# Patient Record
Sex: FEMALE | Race: WHITE | URBAN - METROPOLITAN AREA
[De-identification: names, ages, dates, MRNs, and addresses within clinical notes are randomized per-mention and may not be internally consistent; named-entity substitution may affect disease eponyms.]

---

## 2018-02-08 ENCOUNTER — HISTORICAL (OUTPATIENT)
Dept: LAB | Facility: HOSPITAL | Age: 68
End: 2018-02-08

## 2018-02-08 LAB — HCV AB SERPL QL IA: NEGATIVE

## 2018-06-14 ENCOUNTER — HISTORICAL (OUTPATIENT)
Dept: ADMINISTRATIVE | Facility: HOSPITAL | Age: 68
End: 2018-06-14

## 2019-06-11 ENCOUNTER — HISTORICAL (OUTPATIENT)
Dept: LAB | Facility: HOSPITAL | Age: 69
End: 2019-06-11

## 2019-06-11 ENCOUNTER — HISTORICAL (OUTPATIENT)
Dept: ADMINISTRATIVE | Facility: HOSPITAL | Age: 69
End: 2019-06-11

## 2019-06-11 LAB
ABS NEUT (OLG): 3.9 X10(3)/MCL (ref 2.1–9.2)
ALBUMIN SERPL-MCNC: 4.3 GM/DL (ref 3.4–5)
ALBUMIN/GLOB SERPL: 1.72 {RATIO} (ref 1.5–2.5)
ALP SERPL-CCNC: 57 UNIT/L (ref 38–126)
ALT SERPL-CCNC: 22 UNIT/L (ref 7–52)
APPEARANCE, UA: ABNORMAL
AST SERPL-CCNC: 19 UNIT/L (ref 15–37)
BACTERIA #/AREA URNS AUTO: ABNORMAL /HPF
BILIRUB SERPL-MCNC: 0.4 MG/DL (ref 0.2–1)
BILIRUB UR QL STRIP: NEGATIVE MG/DL
BILIRUBIN DIRECT+TOT PNL SERPL-MCNC: 0.1 MG/DL (ref 0–0.5)
BILIRUBIN DIRECT+TOT PNL SERPL-MCNC: 0.3 MG/DL
BUN SERPL-MCNC: 23 MG/DL (ref 7–18)
CALCIUM SERPL-MCNC: 9.2 MG/DL (ref 8.5–10)
CHLORIDE SERPL-SCNC: 106 MMOL/L (ref 98–107)
CHOLEST SERPL-MCNC: 204 MG/DL (ref 0–200)
CHOLEST/HDLC SERPL: 3.6 {RATIO}
CO2 SERPL-SCNC: 27 MMOL/L (ref 21–32)
COLOR UR: YELLOW
CREAT SERPL-MCNC: 0.67 MG/DL (ref 0.6–1.3)
DEPRECATED CALCIDIOL+CALCIFEROL SERPL-MC: 60.6 NG/ML (ref 30–80)
ERYTHROCYTE [DISTWIDTH] IN BLOOD BY AUTOMATED COUNT: 13.2 % (ref 11.5–17)
GLOBULIN SER-MCNC: 2.5 GM/DL (ref 1.2–3)
GLUCOSE (UA): NEGATIVE MG/DL
GLUCOSE SERPL-MCNC: 104 MG/DL (ref 74–106)
HCT VFR BLD AUTO: 41.1 % (ref 37–47)
HCV AB SERPL QL IA: NEGATIVE
HDLC SERPL-MCNC: 56 MG/DL (ref 35–60)
HGB BLD-MCNC: 13.4 GM/DL (ref 12–16)
HGB UR QL STRIP: ABNORMAL UNIT/L
KETONES UR QL STRIP: NEGATIVE MG/DL
LDLC SERPL CALC-MCNC: 130 MG/DL (ref 0–129)
LEUKOCYTE ESTERASE UR QL STRIP: NEGATIVE UNIT/L
LYMPHOCYTES # BLD AUTO: 1.5 X10(3)/MCL (ref 0.6–3.4)
LYMPHOCYTES NFR BLD AUTO: 24.2 % (ref 13–40)
MCH RBC QN AUTO: 29.8 PG (ref 27–31.2)
MCHC RBC AUTO-ENTMCNC: 33 GM/DL (ref 32–36)
MCV RBC AUTO: 92 FL (ref 80–94)
MONOCYTES # BLD AUTO: 0.7 X10(3)/MCL (ref 0.1–1.3)
MONOCYTES NFR BLD AUTO: 11.1 % (ref 0.1–24)
NEUTROPHILS NFR BLD AUTO: 64.7 % (ref 47–80)
NITRITE UR QL STRIP.AUTO: NEGATIVE
PH UR STRIP: 7 [PH]
PLATELET # BLD AUTO: 345 X10(3)/MCL (ref 130–400)
PMV BLD AUTO: 9.2 FL (ref 9.4–12.4)
POTASSIUM SERPL-SCNC: 4.8 MMOL/L (ref 3.5–5.1)
PROT SERPL-MCNC: 6.8 GM/DL (ref 6.4–8.2)
PROT UR QL STRIP: NEGATIVE MG/DL
RBC # BLD AUTO: 4.49 X10(6)/MCL (ref 4.2–5.4)
RBC #/AREA URNS HPF: ABNORMAL /HPF
SODIUM SERPL-SCNC: 140 MMOL/L (ref 136–145)
SP GR UR STRIP: 1.02
SQUAMOUS EPITHELIAL, UA: ABNORMAL /LPF
TRIGL SERPL-MCNC: 106 MG/DL (ref 30–150)
TSH SERPL-ACNC: 3 MIU/ML (ref 0.35–4.94)
UROBILINOGEN UR STRIP-ACNC: 0.2 MG/DL
VLDLC SERPL CALC-MCNC: 21.2 MG/DL
WBC # SPEC AUTO: 6.1 X10(3)/MCL (ref 4.5–11.5)
WBC #/AREA URNS AUTO: ABNORMAL /[HPF]

## 2019-06-13 ENCOUNTER — HISTORICAL (OUTPATIENT)
Dept: ADMINISTRATIVE | Facility: HOSPITAL | Age: 69
End: 2019-06-13

## 2019-06-13 LAB
APPEARANCE, UA: ABNORMAL
BACTERIA #/AREA URNS AUTO: ABNORMAL /HPF
BILIRUB UR QL STRIP: NEGATIVE MG/DL
COLOR UR: YELLOW
GLUCOSE (UA): NEGATIVE MG/DL
HGB UR QL STRIP: ABNORMAL UNIT/L
KETONES UR QL STRIP: NEGATIVE MG/DL
LEUKOCYTE ESTERASE UR QL STRIP: NEGATIVE UNIT/L
NITRITE UR QL STRIP.AUTO: NEGATIVE
PH UR STRIP: 6.5 [PH]
PROT UR QL STRIP: NEGATIVE MG/DL
RBC #/AREA URNS HPF: ABNORMAL /HPF
SP GR UR STRIP: 1.01
SQUAMOUS EPITHELIAL, UA: ABNORMAL /LPF
UROBILINOGEN UR STRIP-ACNC: 0.2 MG/DL
WBC #/AREA URNS AUTO: ABNORMAL /[HPF]

## 2020-03-03 ENCOUNTER — HISTORICAL (OUTPATIENT)
Dept: LAB | Facility: HOSPITAL | Age: 70
End: 2020-03-03

## 2020-03-03 ENCOUNTER — HISTORICAL (OUTPATIENT)
Dept: ADMINISTRATIVE | Facility: HOSPITAL | Age: 70
End: 2020-03-03

## 2020-03-03 LAB
ABS NEUT (OLG): 13.8 X10(3)/MCL (ref 2.1–9.2)
ERYTHROCYTE [DISTWIDTH] IN BLOOD BY AUTOMATED COUNT: 13 % (ref 11.5–17)
FLUAV AG NPH QL IA: NEGATIVE
FLUBV AG NPH QL IA: NEGATIVE
HCT VFR BLD AUTO: 41.3 % (ref 37–47)
HGB BLD-MCNC: 13.1 GM/DL (ref 12–16)
LYMPHOCYTES # BLD AUTO: 1 X10(3)/MCL (ref 0.6–3.4)
LYMPHOCYTES NFR BLD AUTO: 6.5 % (ref 13–40)
MCH RBC QN AUTO: 28.4 PG (ref 27–31.2)
MCHC RBC AUTO-ENTMCNC: 32 GM/DL (ref 32–36)
MCV RBC AUTO: 90 FL (ref 80–94)
MONOCYTES # BLD AUTO: 0.6 X10(3)/MCL (ref 0.1–1.3)
MONOCYTES NFR BLD AUTO: 4.2 % (ref 0.1–24)
NEUTROPHILS NFR BLD AUTO: 89.3 % (ref 47–80)
PLATELET # BLD AUTO: 465 X10(3)/MCL (ref 130–400)
PMV BLD AUTO: 9.1 FL (ref 9.4–12.4)
RBC # BLD AUTO: 4.61 X10(6)/MCL (ref 4.2–5.4)
WBC # SPEC AUTO: 15.4 X10(3)/MCL (ref 4.5–11.5)

## 2020-03-10 ENCOUNTER — HISTORICAL (OUTPATIENT)
Dept: ADMINISTRATIVE | Facility: HOSPITAL | Age: 70
End: 2020-03-10

## 2020-09-28 ENCOUNTER — HISTORICAL (OUTPATIENT)
Dept: ADMINISTRATIVE | Facility: HOSPITAL | Age: 70
End: 2020-09-28

## 2020-10-01 LAB — FINAL CULTURE: NORMAL

## 2021-03-17 ENCOUNTER — HISTORICAL (OUTPATIENT)
Dept: ADMINISTRATIVE | Facility: HOSPITAL | Age: 71
End: 2021-03-17

## 2021-03-17 LAB
ABS NEUT (OLG): 4.2 X10(3)/MCL (ref 2.1–9.2)
ALBUMIN SERPL-MCNC: 4.2 GM/DL (ref 3.4–5)
ALBUMIN/GLOB SERPL: 1.75 {RATIO} (ref 1.5–2.5)
ALP SERPL-CCNC: 69 UNIT/L (ref 38–126)
ALT SERPL-CCNC: 20 UNIT/L (ref 7–52)
AST SERPL-CCNC: 22 UNIT/L (ref 15–37)
BILIRUB SERPL-MCNC: 0.4 MG/DL (ref 0.2–1)
BILIRUBIN DIRECT+TOT PNL SERPL-MCNC: 0.1 MG/DL (ref 0–0.5)
BILIRUBIN DIRECT+TOT PNL SERPL-MCNC: 0.3 MG/DL
BUN SERPL-MCNC: 21 MG/DL (ref 7–18)
CALCIUM SERPL-MCNC: 9.4 MG/DL (ref 8.5–10)
CHLORIDE SERPL-SCNC: 105 MMOL/L (ref 98–107)
CO2 SERPL-SCNC: 26 MMOL/L (ref 21–32)
CREAT SERPL-MCNC: 0.73 MG/DL (ref 0.6–1.3)
ERYTHROCYTE [DISTWIDTH] IN BLOOD BY AUTOMATED COUNT: 13.5 % (ref 11.5–17)
GLOBULIN SER-MCNC: 2.4 GM/DL (ref 1.2–3)
GLUCOSE SERPL-MCNC: 98 MG/DL (ref 74–106)
HCT VFR BLD AUTO: 38.4 % (ref 37–47)
HGB BLD-MCNC: 12.3 GM/DL (ref 12–16)
LYMPHOCYTES # BLD AUTO: 2.2 X10(3)/MCL (ref 0.6–3.4)
LYMPHOCYTES NFR BLD AUTO: 31.9 % (ref 13–40)
MCH RBC QN AUTO: 28.9 PG (ref 27–31.2)
MCHC RBC AUTO-ENTMCNC: 32 GM/DL (ref 32–36)
MCV RBC AUTO: 90 FL (ref 80–94)
MONOCYTES # BLD AUTO: 0.6 X10(3)/MCL (ref 0.1–1.3)
MONOCYTES NFR BLD AUTO: 8.9 % (ref 0.1–24)
NEUTROPHILS NFR BLD AUTO: 59.2 % (ref 47–80)
PLATELET # BLD AUTO: 347 X10(3)/MCL (ref 130–400)
PMV BLD AUTO: 9.1 FL (ref 9.4–12.4)
POTASSIUM SERPL-SCNC: 4.5 MMOL/L (ref 3.5–5.1)
PROT SERPL-MCNC: 6.6 GM/DL (ref 6.4–8.2)
RBC # BLD AUTO: 4.26 X10(6)/MCL (ref 4.2–5.4)
SODIUM SERPL-SCNC: 140 MMOL/L (ref 136–145)
WBC # SPEC AUTO: 7 X10(3)/MCL (ref 4.5–11.5)

## 2022-04-11 ENCOUNTER — HISTORICAL (OUTPATIENT)
Dept: ADMINISTRATIVE | Facility: HOSPITAL | Age: 72
End: 2022-04-11

## 2022-04-25 VITALS
WEIGHT: 222.25 LBS | SYSTOLIC BLOOD PRESSURE: 136 MMHG | OXYGEN SATURATION: 97 % | HEIGHT: 60 IN | BODY MASS INDEX: 43.63 KG/M2 | DIASTOLIC BLOOD PRESSURE: 78 MMHG

## 2022-05-05 NOTE — HISTORICAL [facility] CERNER
This is a historical note converted from Jayesh. Formatting and pictures may have been removed.  Please reference Jayesh for original formatting and attached multimedia. Chief Complaint  cough/night sweats  History of Present Illness  The patient is a 69 year old white female who presents today with symptoms of cough, cold, and congestion.? Duration of symptoms?is 1 week. ?Associated symptoms include sore throat,?nasal congestion,?rhinorrhea,?sinus pressure,?and productive cough.? Prior treatment?over-the-counter has been?with?Mucinex.? The patient reports?subjective?fever and chills.? The patient reports negative?sick contacts.? Symptoms began last week on approximately Thursday?with body aches, fatigue, and cough.? Over time, the patient?reports?intermittent fever and chills subjectively, night sweats, persistent cough,?intermittent?left?chest?pleuritic pain with coughing. ?No shortness of breath?and no wheeze.? No similar symptoms in the past.? He initiated a Medrol Dosepak and a Z-Saud?yesterday with some improvement in symptoms.  Review of Systems  Constitutional_no fever?or chills/positive fatigue/no unintentional weight gain  Eye_  ENMT_nasal congestion/sinus?pressure?and tenderness/postnasal drip/ear pressure bilaterally  Respiratory_positive?productive cough/no shortness of breath  Cardiovascular_no chest pain/no palpitations  Gastrointestinal_  Genitourinary_  Hema/Lymph_positive cervical anterior?adenopathy  Endocrine_  Immunologic_  Musculoskeletal_left chest?wall pain with coughing  Integumentary_no rash  Neurologic_no headache  All Other ROS_negative  Physical Exam  Vitals & Measurements  T:?36.8? ?C (Oral)? SpO2:?94%?  ?  VITAL SIGNS:? Reviewed.?Within normal limits.  GENERAL:?In no apparent distress.? Alert and Oriented x3  HEAD:?No signs of head trauma.Normocephalic  EYES:?Pupils equal/round/reactive to light.? Extraocular motionsintact.?no scleral injection  EARS:? Hearing grosslyintact. TMs and  EACclearno drainage. Helix/tragus?not tenderto traction  NARES:?No erythema.? Turbinatesnot boggy.?No discharge.?No lesions.  SINUSES:?Nontender  MOUTH:? Oropharynx isclear.No erythema.No exudatesNo cobblestoningNo ulcerations.tonsils2+  NECK:?No Lymphadenopathy.No Jugulovenous distension.?No thyromegaly.No bruits  CHEST:? Chest withclear breath sounds bilaterally.?+wheezes,+rales,+rhonchi, positive crackle?all with?expiration and inspiration?left?lower lung base.? Mild increased?vocal fremitus in the same region..?+Diminished air movement-primarily?caused by pain with deep inspiration.  CARDIAC:?Regular rate and rhythm.?S1 and S2,without murmurs,gallops, orrubs.  SKIN:?No rashorlesions.  Assessment/Plan  1.?Left lower lobe pneumonia?J18.9  ?-The patient is clinically stable within the office today but ER precautions were given for new or worsening symptoms  -Chest x-ray was performed?and?noted bilateral active infiltrates?versus atelectatic?changes?in?bilateral bases left greater than right.-Correlates with?active infiltrate based on patients recent symptoms and HPI and physical exam  -Informational packet given on pneumonia?and ER precautions given.  -Rapid flu negative  -CBC/mycoplasma drawn  -Rocephin 1 g IM x1  -Levaquin?750 mg 1 tab by mouth daily x10 days/dispense #10 with no refills  -Phenergan DM to be used as prescribed below PRN  -Incentive spirometer issued?and education was given?on use 10 actuations per hour  -Patient should use her?already required albuterol rescue inhaler as needed for tightness of chest/wheezing  -OTC Mucinex D?and coolmist humidifier advocated  -Follow-up in 7 days for repeat chest x-ray and repeat clinical evaluation or sooner if clinically indicated.  Ordered:  levoFLOXacin, 750 mg = 1 tab(s), Oral, q24hr, take probiotic daily, X 10 day(s), # 10 tab(s), 0 Refill(s), Pharmacy: [facility]/pharmacy #[id number], 152, cm, Height/Length Dosing, 06/13/19 13:34:00 CDT, 100.8, kg, Weight Dosing,  06/13/19 13:34:00 CDT  Misc Prescription, INCENTIVE SPIROMETER, See Instructions, 10 ACTUATIONS PER HOUR AS DIRECTED DX: PNEUMONIA, # 1 EA, 0 Refill(s)  Clinic Follow up, *Est. 03/10/20 14:15:00 CDT, Order for future visit, Left lower lobe pneumonia, [facility]  No Charge Per Visit PC, Left lower lobe pneumonia  Eustachian tube dysfunction, [facility], 03/03/20 14:40:00 CST  ?  2.?Eustachian tube dysfunction?H69.80  ?-ET dysfunction likely secondary to early?viral?infection?which was also likely the etiology of this pneumonia  -Continue?Zyrtec/fluticasone/Neomed sinus rinse  Ordered:  No Charge Per Visit PC, Left lower lobe pneumonia  Eustachian tube dysfunction, [facility], 03/03/20 14:40:00 CST  ?  Orders:  Misc Prescription, Phenergan DM, 1 tsp, Oral, q6hr, PRN cough, # 3 oz, 0 Refill(s), Pharmacy: [facility]/pharmacy #[id number], 152, cm, Height/Length Dosing, 06/13/19 13:34:00 CDT, 100.8, kg, Weight Dosing, 06/13/19 13:34:00 CDT  Referrals  Clinic Follow up, *Est. 03/10/20 14:15:00 CDT, Order for future visit, Left lower lobe pneumonia, [facility]   Problem List/Past Medical History  Ongoing  HLD (hyperlipidemia)  Morbid obesity  Osteopenia  Historical  No qualifying data  Procedure/Surgical History  Colonoscopy (2012)  H/O: hysterectomy  Tonsillectomy   Medications  albuterol 90 mcg/inh inhalation aerosol, 2 puff(s), INH, q6hr, PRN, 5 refills  Breo Ellipta 100 mcg-25 mcg/inh inhalation powder, 1 puff(s), INH, Daily, 5 refills  Coenzyme Q10 100 mg oral capsule, 100 mg= 1 cap(s), Oral, Daily  INCENTIVE SPIROMETER, See Instructions  Levaquin 750 mg oral tablet, 750 mg= 1 tab(s), Oral, q24hr  montelukast 10 mg oral TABLET, See Instructions, 5 refills  Phenergan DM, 1 tsp, Oral, q6hr, PRN  rosuvastatin 5 mg oral tablet, 5 mg= 1 tab(s), Oral, Once a day (at bedtime), 3 refills  Allergies  No Known Medication Allergies  Social History  Abuse/Neglect  No, 06/13/2019  Alcohol  Current, 1-2 times per month,  05/16/2019  Employment/School  Employed, 05/16/2019  Tobacco  Never (less than 100 in lifetime), N/A, 06/13/2019  Never (less than 100 in lifetime), N/A, 05/16/2019  Family History  Asthma.: Brother.  Dementia: Mother.  Primary malignant neoplasm of lung: Grandfather.  Skin cancer: Mother, Father and Brother.  Stroke: Mother, Aunt, Grandfather and Grandmother.  Thyroid disease: Grandmother.  Immunizations  Vaccine Date Status   influenza virus vaccine, inactivated 11/06/2019 Given   zoster vaccine, inactivated 09/11/2019 Given   influenza virus vaccine, inactivated 11/13/2018 Given   diphtheria toxoid 2018 Recorded   pneumococcal 23-polyvalent vaccine 2017 Recorded   pneumococcal 13-valent conjugate vaccine 2015 Recorded   influenza virus vaccine, inactivated 11/03/2014 Recorded   influenza virus vaccine, inactivated 12/11/2013 Recorded   influenza virus vaccine, inactivated 11/30/2011 Recorded   influenza virus vaccine, inactivated 10/27/2010 Recorded   Health Maintenance  Health Maintenance  ???Pending?(in the next year)  ??? ??OverDue  ??? ? ? ?Pneumococcal Vaccine due??and every?  ??? ? ? ?Advance Directive due??01/01/20??and every 1??year(s)  ??? ? ? ?Alcohol Misuse Screening due??01/01/20??and every 1??year(s)  ??? ? ? ?Geriatric Depression Screening due??01/01/20??and every 1??year(s)  ??? ? ? ?Obesity Screening due??01/01/20??and every 1??year(s)  ??? ??Due?  ??? ? ? ?Cognitive Screening due??01/01/20??and every 1??year(s)  ??? ? ? ?Fall Risk Assessment due??01/01/20??and every 1??year(s)  ??? ? ? ?Functional Assessment due??01/01/20??and every 1??year(s)  ??? ? ? ?Bone Density Screening due??03/02/20??and every 2??year(s)  ??? ? ? ?ADL Screening due??03/03/20??and every 1??year(s)  ??? ? ? ?Tetanus Vaccine due??03/03/20??and every 10??year(s)  ??? ??Due In Future?  ??? ? ? ?Aspirin Therapy for CVD Prevention not due until??06/13/20??and every 1??year(s)  ???Satisfied?(in the past 1 year)  ???  ??Satisfied?  ??? ? ? ?Alcohol Misuse Screening on??06/13/19.??Satisfied by Vincenzo Mead MD  ??? ? ? ?Aspirin Therapy for CVD Prevention on??06/13/19.??Satisfied by Vincenzo Mead MD  ??? ? ? ?Blood Pressure Screening on??06/14/19.??Satisfied by Katie Bajwa MA  ??? ? ? ?Body Mass Index Check on??06/13/19.??Satisfied by Rekha Soriano LPN  ??? ? ? ?Breast Cancer Screening ([facility]) on??07/26/19.??Satisfied by Shameka Norton  ??? ? ? ?Diabetes Screening on??06/11/19.??Satisfied by Gris Perry  ??? ? ? ?Influenza Vaccine on??11/06/19.??Satisfied by Katie Bajwa MA  ??? ? ? ?Lipid Screening on??06/11/19.??Satisfied by Gris Perry  ??? ? ? ?Obesity Screening on??06/13/19.??Satisfied by Rkeha Soriaon LPN  ??? ? ? ?Zoster Vaccine on??09/11/19.??Satisfied by Katie Bajwa MA  ?      Mycoplasma negative. ?CBC displays?left shift/primary neutrophil increase-correlates well with bacterial?pneumonia.? Patient?advised.? Follow-up as scheduled or sooner if clinically indicated.

## 2022-05-05 NOTE — HISTORICAL [facility] CERNER
This is a historical note converted from Jayesh. Formatting and pictures may have been removed.  Please reference Jayesh for original formatting and attached multimedia. Chief Complaint  1 wk recheck  History of Present Illness  The patient is a?69-year-old white female here for follow-up?reevaluation to left lower lobe pneumonia and atelectasis.? The patients Levaquin was discontinued secondary to side effects?of disorientation and irritability and GI upset. ?She was initiated on doxycycline twice a day and has about 7 to 10 days left. ?She is tolerating the medication well with no side effects. ?She reports 100% resolution of symptoms with no coughing, no fever chills, no shortness of breath. ?She has been using her incentive spirometer.? No acute issues currently.  Review of Systems  Constitutional_no fever chills,?no unintentional weight loss  Eye_  ENMT_  Respiratory_no shortness of breath or cough/as per HPI  Cardiovascular_no chest pain?or shortness of breath  Gastrointestinal_  Genitourinary_  Hema/Lymph_  Endocrine_  Immunologic_  Musculoskeletal_  Integumentary_  Neurologic_  All Other ROS_negative  Physical Exam  Vitals & Measurements  HR:?68(Peripheral)? SpO2:?97%?  ?  VITAL SIGNS:? Reviewed.? ?Of note room air saturation much improved to 97%  GENERAL:? In?no apparent distress.? Alert and Oriented x3  CHEST:? Chest with clear breath sounds bilaterally.??No wheezes, rales, or rhonchi. Good air movement?some residual?crackles/atelectasis?left lower lung on expiration and inspiration-mild and much improved from?prior evaluation. ?No ED changes. ?No increased vocal fremitus. ?No dullness to percussion.  CARDIAC:??Regular rate and rhythm.? S1 and S2,?without murmurs, gallops, or rubs.  ABDOMINAL: Normal active BS X all 4 quadrants. Nontender. Nondistended.  NEUROLOGIC EXAM:? Alert and oriented x 3.? No focal sensory or strength deficits.? ?Speech normal.? Follows commands.  MUSCULOSKELATAL: Full range of  motion.? 5 out of 5 strength throughout.  SKIN: No rash. ?No lesion.  PSYCHIATRIC:? Mood normal.  ?  ?  Assessment/Plan  1.?Pneumonia?J18.9  ?-Clinically improved at this point with stable vital signs  -Chest x-ray-preliminary read by Alyce-yields some?persistence of scarring and atelectasis but noted improvement of?left lower lobe pneumonia?versus advanced atelectasis  -Continue and complete doxycycline  -Continue use of incentive spirometer a few times a day  -Follow-up final read from radiology  -ER precautions given for worsening or new  Ordered:  No Charge Per Visit PC, Pneumonia, [facility], 03/10/20 13:07:00 CDT  XR Chest 2 Views, Routine, 03/10/20 13:01:00 CDT, Other (please specify), None, Ambulatory, Rad Type, Pneumonia, [facility], 03/10/20 13:01:00 CDT  ?  Orders:  Clinic Follow-up PRN, 03/10/20 13:07:00 CDT, [facility], Future Order  Referrals  Clinic Follow-up PRN, 03/10/20 13:07:00 CDT, [facility], Future Order   Problem List/Past Medical History  Ongoing  HLD (hyperlipidemia)  Morbid obesity  Osteopenia  Historical  No qualifying data  Procedure/Surgical History  Colonoscopy (2012)  H/O: hysterectomy  Tonsillectomy   Medications  albuterol 90 mcg/inh inhalation aerosol, 2 puff(s), INH, q6hr, PRN, 5 refills  Breo Ellipta 100 mcg-25 mcg/inh inhalation powder, 1 puff(s), INH, Daily, 5 refills  cefTRIAXone, 1 gm, IM, Once  Coenzyme Q10 100 mg oral capsule, 100 mg= 1 cap(s), Oral, Daily  doxycycline monohydrate 100 mg oral capsule, 100 mg= 1 cap(s), Oral, q12hr  INCENTIVE SPIROMETER, See Instructions  montelukast 10 mg oral TABLET, See Instructions, 5 refills  Phenergan DM, 1 tsp, Oral, q6hr, PRN  rosuvastatin 5 mg oral tablet, 5 mg= 1 tab(s), Oral, Once a day (at bedtime), 3 refills  Allergies  Levaquin?(Dreams)  Social History  Abuse/Neglect  No, 06/13/2019  Alcohol  Current, 1-2 times per month, 05/16/2019  Employment/School  Employed, 05/16/2019  Tobacco  Never (less  than 100 in lifetime), N/A, 06/13/2019  Never (less than 100 in lifetime), N/A, 05/16/2019  Family History  Asthma.: Brother.  Dementia: Mother.  Primary malignant neoplasm of lung: Grandfather.  Skin cancer: Mother, Father and Brother.  Stroke: Mother, Aunt, Grandfather and Grandmother.  Thyroid disease: Grandmother.  Immunizations  Vaccine Date Status   influenza virus vaccine, inactivated 11/06/2019 Given   zoster vaccine, inactivated 09/11/2019 Given   influenza virus vaccine, inactivated 11/13/2018 Given   diphtheria toxoid 2018 Recorded   pneumococcal 23-polyvalent vaccine 2017 Recorded   pneumococcal 13-valent conjugate vaccine 2015 Recorded   influenza virus vaccine, inactivated 11/03/2014 Recorded   influenza virus vaccine, inactivated 12/11/2013 Recorded   influenza virus vaccine, inactivated 11/30/2011 Recorded   influenza virus vaccine, inactivated 10/27/2010 Recorded   Health Maintenance  Health Maintenance  ???Pending?(in the next year)  ??? ??OverDue  ??? ? ? ?Pneumococcal Vaccine due??and every?  ??? ? ? ?Advance Directive due??01/01/20??and every 1??year(s)  ??? ? ? ?Alcohol Misuse Screening due??01/01/20??and every 1??year(s)  ??? ? ? ?Cognitive Screening due??01/01/20??and every 1??year(s)  ??? ? ? ?Fall Risk Assessment due??01/01/20??and every 1??year(s)  ??? ? ? ?Functional Assessment due??01/01/20??and every 1??year(s)  ??? ? ? ?Geriatric Depression Screening due??01/01/20??and every 1??year(s)  ??? ? ? ?Obesity Screening due??01/01/20??and every 1??year(s)  ??? ??Due?  ??? ? ? ?Bone Density Screening due??03/02/20??and every 2??year(s)  ??? ? ? ?ADL Screening due??03/10/20??and every 1??year(s)  ??? ? ? ?Tetanus Vaccine due??03/10/20??and every 10??year(s)  ??? ??Due In Future?  ??? ? ? ?Aspirin Therapy for CVD Prevention not due until??06/13/20??and every 1??year(s)  ???Satisfied?(in the past 1 year)  ??? ??Satisfied?  ??? ? ? ?Alcohol Misuse Screening on??06/13/19.??Satisfied by Alyce KELLEY,  Vincenzo BEST  ??? ? ? ?Aspirin Therapy for CVD Prevention on??06/13/19.??Satisfied by Vincenzo Mead MD  ??? ? ? ?Blood Pressure Screening on??06/14/19.??Satisfied by Katie Bajwa MA  ??? ? ? ?Body Mass Index Check on??06/13/19.??Satisfied by Rekha Soriano LPN  ??? ? ? ?Breast Cancer Screening ([facility]) on??07/26/19.??Satisfied by Shameka Norton  ??? ? ? ?Diabetes Screening on??06/11/19.??Satisfied by Gris Perry  ??? ? ? ?Influenza Vaccine on??11/06/19.??Satisfied by Katie Bajwa MA  ??? ? ? ?Lipid Screening on??06/11/19.??Satisfied by Gris Perry  ??? ? ? ?Obesity Screening on??06/13/19.??Satisfied by Rekha Soriano LPN  ??? ? ? ?Zoster Vaccine on??09/11/19.??Satisfied by Katie Bajwa MA  ?

## 2024-06-05 PROBLEM — N39.3 STRESS INCONTINENCE OF URINE: Status: ACTIVE | Noted: 2024-06-05

## 2024-06-05 PROBLEM — R92.30 DENSE BREAST TISSUE: Status: ACTIVE | Noted: 2024-06-05

## 2024-06-05 PROBLEM — E78.2 MIXED HYPERLIPIDEMIA: Status: ACTIVE | Noted: 2024-06-05

## 2024-06-05 PROBLEM — Z82.3 FAMILY HISTORY OF STROKE OR TRANSIENT ISCHEMIC ATTACK IN MOTHER: Status: ACTIVE | Noted: 2024-06-05

## 2024-06-05 PROBLEM — R93.1 ELEVATED CORONARY ARTERY CALCIUM SCORE: Status: ACTIVE | Noted: 2024-06-05

## 2025-06-17 PROCEDURE — 82607 VITAMIN B-12: CPT | Performed by: FAMILY MEDICINE

## 2025-06-17 PROCEDURE — 83540 ASSAY OF IRON: CPT | Performed by: FAMILY MEDICINE

## 2025-06-18 PROBLEM — H25.013 CORTICAL AGE-RELATED CATARACT OF BOTH EYES: Status: ACTIVE | Noted: 2025-06-18

## 2025-06-23 NOTE — PROGRESS NOTES
History & Physical    CHIEF COMPLAINT:  UMBILICAL HERNIA    History of Present Illness:  74 year-old-female referred by Dr. Mead for an umbilical hernia.  Ports mild umbilical bulging, no significant discomfort.  No nausea vomiting constipation or obstructive symptoms.  No previous history of surgery in this area      Review of patient's allergies indicates:   Allergen Reactions    Levofloxacin      Other reaction(s): Dreams       Current Medications[1]    Past Medical History:   Diagnosis Date    Dense breast tissue     Elevated coronary artery calcium score     Hyperlipidemia     Stress incontinence of urine      Past Surgical History:   Procedure Laterality Date    HYSTERECTOMY      pelvic floor lift      TONSILLECTOMY      TUBAL LIGATION       Family History   Problem Relation Name Age of Onset    Heart disease Mother      Heart disease Father       Social History[2]     Review of Systems:  Review of Systems   Constitutional:  Negative for appetite change, chills, diaphoresis and fever.   HENT:  Negative for congestion, drooling, ear discharge, ear pain and hearing loss.    Eyes:  Negative for discharge.   Respiratory:  Negative for apnea, cough, choking, chest tightness, shortness of breath and stridor.    Cardiovascular:  Negative for chest pain, palpitations and leg swelling.   Endocrine: Negative for cold intolerance and heat intolerance.   Genitourinary:  Negative for difficulty urinating, dyspareunia, dysuria and hematuria.   Musculoskeletal:  Negative for arthralgias, gait problem and joint swelling.   Skin:  Negative for color change and rash.   Neurological:  Negative for dizziness, tremors, seizures, syncope, facial asymmetry, speech difficulty, light-headedness, numbness and headaches.   Psychiatric/Behavioral:  Negative for agitation and confusion.           Objective     Vital Signs (Most Recent)              Physical Exam:  Physical Exam  Constitutional:       General: She is not in acute  distress.     Appearance: Normal appearance. She is not toxic-appearing.   HENT:      Head: Normocephalic and atraumatic.      Right Ear: External ear normal.      Left Ear: External ear normal.      Mouth/Throat:      Mouth: Mucous membranes are moist.   Eyes:      General: No scleral icterus.     Conjunctiva/sclera: Conjunctivae normal.      Pupils: Pupils are equal, round, and reactive to light.   Cardiovascular:      Rate and Rhythm: Normal rate and regular rhythm.      Pulses: Normal pulses.   Pulmonary:      Effort: Pulmonary effort is normal. No respiratory distress.      Breath sounds: Normal breath sounds. No stridor. No wheezing or rhonchi.   Abdominal:      General: Abdomen is flat. There is no distension.      Palpations: Abdomen is soft. There is no mass.      Tenderness: There is no abdominal tenderness. There is no guarding or rebound.      Hernia: A hernia is present.      Comments: Small supraumbilical hernia defect a proximally 1 cm, reducing   Musculoskeletal:         General: No swelling or tenderness. Normal range of motion.      Cervical back: Normal range of motion and neck supple.      Right lower leg: No edema.      Left lower leg: No edema.   Lymphadenopathy:      Upper Body:      Right upper body: No supraclavicular or axillary adenopathy.      Left upper body: No supraclavicular or axillary adenopathy.   Skin:     General: Skin is warm.      Capillary Refill: Capillary refill takes less than 2 seconds.      Coloration: Skin is not jaundiced or pale.      Findings: No erythema or rash.   Neurological:      General: No focal deficit present.      Mental Status: She is alert and oriented to person, place, and time.      Motor: No weakness.      Coordination: Coordination normal.      Gait: Gait normal.   Psychiatric:         Mood and Affect: Mood normal.         Behavior: Behavior normal.         Judgment: Judgment normal.            Assessment and Plan     Small 1 cm umbilical hernia defect  with no significant symptoms.    Recommend only observation at this time, she is instructed to call if further symptoms develop.    Return to clinic in 6 months for reexamination    Deven Rutledge MD  Surgical Oncology  Complex General, Gastrointestinal and Hepatobiliary Surgery         [1]   Current Outpatient Medications   Medication Sig Dispense Refill    albuterol (PROVENTIL/VENTOLIN HFA) 90 mcg/actuation inhaler Inhale 2 puffs into the lungs every 6 (six) hours as needed for Wheezing. Rescue 18 g 5    co-enzyme Q-10 30 mg capsule Take 30 mg by mouth.      DUPIXENT SYRINGE 300 mg/2 mL Syrg Inhale 2 mLs into the lungs every 14 (fourteen) days.      estradioL (ESTRACE) 0.01 % (0.1 mg/gram) vaginal cream Place 1 g vaginally twice a week.      fluticasone (VERAMYST) 27.5 mcg/actuation nasal spray 1 spray by Nasal route.      fluticasone furoate-vilanteroL (BREO ELLIPTA) 200-25 mcg/dose DsDv diskus inhaler Inhale 1 puff into the lungs once daily. 1 each 11    fluticasone propionate (FLONASE) 50 mcg/actuation nasal spray 1 spray by Each Nostril route once daily.      meloxicam (MOBIC) 15 MG tablet Take 1 tablet (15 mg total) by mouth once daily. PRN pain 30 tablet 5    montelukast (SINGULAIR) 10 mg tablet Take 1 tablet (10 mg total) by mouth once daily. 90 tablet 3    omeprazole (PRILOSEC) 20 MG capsule Take 20 mg by mouth once daily.      ondansetron (ZOFRAN-ODT) 8 MG TbDL Take 1 tablet (8 mg total) by mouth every 8 (eight) hours as needed (nausea). for one dose 30 tablet 2    RESTASIS 0.05 % ophthalmic emulsion Place 1 drop into both eyes 2 (two) times daily.      rosuvastatin (CRESTOR) 10 MG tablet Take 1 tablet (10 mg total) by mouth every evening. 90 tablet 3    tirzepatide, weight loss, (ZEPBOUND) 15 mg/0.5 mL PnIj Inject 15 mg into the skin every 7 days. 12 Pen 3    valACYclovir (VALTREX) 1000 MG tablet Take 2 tablets by mouth q.12 hours x2 doses.  Begin at 1st sign of fever blister. 30 tablet 2     No current  facility-administered medications for this visit.   [2]   Social History  Tobacco Use    Smoking status: Never    Smokeless tobacco: Never   Substance Use Topics    Alcohol use: Yes     Comment: socially    Drug use: Never

## 2025-06-25 ENCOUNTER — OFFICE VISIT (OUTPATIENT)
Dept: SURGICAL ONCOLOGY | Facility: CLINIC | Age: 75
End: 2025-06-25
Payer: COMMERCIAL

## 2025-06-25 VITALS
HEART RATE: 60 BPM | SYSTOLIC BLOOD PRESSURE: 109 MMHG | DIASTOLIC BLOOD PRESSURE: 80 MMHG | RESPIRATION RATE: 18 BRPM | BODY MASS INDEX: 30.43 KG/M2 | OXYGEN SATURATION: 99 % | TEMPERATURE: 98 F | HEIGHT: 61 IN | WEIGHT: 161.19 LBS

## 2025-06-25 DIAGNOSIS — K42.9 UMBILICAL HERNIA WITHOUT OBSTRUCTION AND WITHOUT GANGRENE: ICD-10-CM

## 2025-06-25 PROCEDURE — 99999 PR PBB SHADOW E&M-EST. PATIENT-LVL V: CPT | Mod: PBBFAC,,, | Performed by: SURGERY

## 2025-06-25 PROCEDURE — 1159F MED LIST DOCD IN RCRD: CPT | Mod: CPTII,S$GLB,, | Performed by: SURGERY

## 2025-06-25 PROCEDURE — 99203 OFFICE O/P NEW LOW 30 MIN: CPT | Mod: S$GLB,,, | Performed by: SURGERY

## 2025-06-25 PROCEDURE — 1101F PT FALLS ASSESS-DOCD LE1/YR: CPT | Mod: CPTII,S$GLB,, | Performed by: SURGERY

## 2025-06-25 PROCEDURE — 3288F FALL RISK ASSESSMENT DOCD: CPT | Mod: CPTII,S$GLB,, | Performed by: SURGERY

## 2025-06-25 PROCEDURE — 3008F BODY MASS INDEX DOCD: CPT | Mod: CPTII,S$GLB,, | Performed by: SURGERY

## 2025-06-25 PROCEDURE — 3079F DIAST BP 80-89 MM HG: CPT | Mod: CPTII,S$GLB,, | Performed by: SURGERY

## 2025-06-25 PROCEDURE — 3074F SYST BP LT 130 MM HG: CPT | Mod: CPTII,S$GLB,, | Performed by: SURGERY

## 2025-06-25 PROCEDURE — 3044F HG A1C LEVEL LT 7.0%: CPT | Mod: CPTII,S$GLB,, | Performed by: SURGERY

## 2025-06-25 PROCEDURE — 1126F AMNT PAIN NOTED NONE PRSNT: CPT | Mod: CPTII,S$GLB,, | Performed by: SURGERY
